# Patient Record
Sex: MALE | Race: WHITE | NOT HISPANIC OR LATINO | Employment: OTHER | URBAN - METROPOLITAN AREA
[De-identification: names, ages, dates, MRNs, and addresses within clinical notes are randomized per-mention and may not be internally consistent; named-entity substitution may affect disease eponyms.]

---

## 2022-05-24 ENCOUNTER — OFFICE VISIT (OUTPATIENT)
Dept: GASTROENTEROLOGY | Facility: CLINIC | Age: 82
End: 2022-05-24
Payer: COMMERCIAL

## 2022-05-24 ENCOUNTER — TELEPHONE (OUTPATIENT)
Dept: GASTROENTEROLOGY | Facility: CLINIC | Age: 82
End: 2022-05-24

## 2022-05-24 VITALS
SYSTOLIC BLOOD PRESSURE: 118 MMHG | WEIGHT: 258 LBS | BODY MASS INDEX: 36.12 KG/M2 | HEART RATE: 58 BPM | DIASTOLIC BLOOD PRESSURE: 82 MMHG | HEIGHT: 71 IN

## 2022-05-24 DIAGNOSIS — I26.99 OTHER ACUTE PULMONARY EMBOLISM WITHOUT ACUTE COR PULMONALE (HCC): ICD-10-CM

## 2022-05-24 DIAGNOSIS — Z79.01 CURRENT USE OF LONG TERM ANTICOAGULATION: ICD-10-CM

## 2022-05-24 DIAGNOSIS — C18.9 MALIGNANT NEOPLASM OF COLON, UNSPECIFIED PART OF COLON (HCC): Primary | ICD-10-CM

## 2022-05-24 PROBLEM — G47.33 OSA (OBSTRUCTIVE SLEEP APNEA): Status: ACTIVE | Noted: 2022-05-24

## 2022-05-24 PROBLEM — C61 PROSTATE CANCER (HCC): Status: ACTIVE | Noted: 2022-05-24

## 2022-05-24 PROCEDURE — 99204 OFFICE O/P NEW MOD 45 MIN: CPT | Performed by: NURSE PRACTITIONER

## 2022-05-24 RX ORDER — HYDROCHLOROTHIAZIDE 12.5 MG/1
12.5 TABLET ORAL DAILY
COMMUNITY

## 2022-05-24 RX ORDER — METOPROLOL SUCCINATE 50 MG/1
50 TABLET, EXTENDED RELEASE ORAL DAILY
COMMUNITY

## 2022-05-24 RX ORDER — ALFUZOSIN HYDROCHLORIDE 10 MG/1
10 TABLET, EXTENDED RELEASE ORAL DAILY
COMMUNITY

## 2022-05-24 RX ORDER — SODIUM PICOSULFATE, MAGNESIUM OXIDE, AND ANHYDROUS CITRIC ACID 10; 3.5; 12 MG/160ML; G/160ML; G/160ML
LIQUID ORAL
Qty: 320 ML | Refills: 0 | Status: SHIPPED | COMMUNITY
Start: 2022-05-24

## 2022-05-24 RX ORDER — SIMVASTATIN 10 MG
10 TABLET ORAL
COMMUNITY

## 2022-05-24 RX ORDER — TRAMADOL HYDROCHLORIDE 50 MG/1
50 TABLET ORAL AS NEEDED
COMMUNITY

## 2022-05-24 RX ORDER — FLUTICASONE PROPIONATE 50 MCG
1 SPRAY, SUSPENSION (ML) NASAL DAILY
COMMUNITY

## 2022-05-24 RX ORDER — AMLODIPINE BESYLATE AND BENAZEPRIL HYDROCHLORIDE 5; 20 MG/1; MG/1
1 CAPSULE ORAL DAILY
COMMUNITY

## 2022-05-24 NOTE — PROGRESS NOTES
[facility] - Outpatient Consultation  Juventino Weinstein 80 y o  male MRN: [id number]  Encounter: [id number]    ASSESSMENT AND PLAN:    1  History of colon cancer  2  Change in bowel habits/constipation  3  Screening for colon cancer  History of colon cancer which was resected approximately 10 years ago  No recurrence  Referred by Oncology for screening colonoscopy  One year history of constipation with hard bowel movement every 2-3 days  Denies melena or rectal bleeding  No alarm symptoms  Prior colonoscopy approximately 4 years ago was normal  -scheduled for colonoscopy at [facility] due to DASIA, Mallanpatti class 3 airway  - will obtain prior colonoscopy records from [facility]    4  History of pulmonary emboli  5  Anticoagulation  Patient reports pulmonary emboli approximately 5 years ago after undergoing hip surgery  Currently takes Eliquis b i d   Discussed risk of bleeding if colonoscopy performed on anticoagulation  Also discussed risk of thrombus formation with interrupting anticoagulation  Patient understands and accepts risks  -instructed to hold Eliquis 2 days prior to colonoscopy  -will confer with patient's PCP to ensure no further instructions or recommendations needed prior to holding anticoagulation    Followup Appointment:  As needed  ______________________________________________________________________    Chief Complaint   Patient presents with    Due for colonoscopy  Pt is on Eliquis       HPI:   Juventino Weinstein is a 80y o  year old male who presents to schedule surveillance colonoscopy  He has a history of colon cancer and underwent colon resection approximately 5 years ago  He follows with [facility] who referred him for colonoscopy    He has noticed over the past year he has developed constipation with hard formed bowel movement every 2-3 days over the past year    Previously had soft bowel movement daily Denies recent abdominal or rectal pain  No melena or rectal bleeding  Denies recent dysphagia  Appetite has been good, no nausea or vomiting, no unintentional weight loss  Last colonoscopy was approximately 4 years ago at [address] - study was normal, no polyps excised    Still follows with [facility]  - also has history ofprostate cancer, s/p xrt and leukemiaI  He currently takes Eliquis for history of pulmonary emboli which he thinks occurred after undergoing hip surgery 5 years ago    Historical Information   Past Medical History:   Diagnosis Date    [facility] )     [facility] )     Colon polyp     Hypertension     Leukemia ([facility] )     Prostate CA ([facility] )     Pulmonary emboli (HCC)      Past Surgical History:   Procedure Laterality Date    COLON SURGERY      COLONOSCOPY      HERNIA REPAIR      REPLACEMENT TOTAL HIP W/  RESURFACING IMPLANTS       Social History     Substance and Sexual Activity   Alcohol Use Yes    Alcohol/week: 1 0 standard drink    Types: 1 Glasses of wine per week    Comment: occasional     Social History     Substance and Sexual Activity   Drug Use Not on file     Social History     Tobacco Use   Smoking Status Former Smoker   Smokeless Tobacco Never Used     Family History   Problem Relation Age of Onset    Colon cancer Brother     Colon polyps Neg Hx        Meds/Allergies     Current Outpatient Medications:     alfuzosin (UROXATRAL) 10 mg 24 hr tablet    amLODIPine-benazepril (LOTREL 5-20) 5-20 MG per capsule    apixaban (Eliquis) 2 5 mg    fluticasone (FLONASE) 50 mcg/act nasal spray    hydrochlorothiazide (HYDRODIURIL) 12 5 mg tablet    metoprolol succinate (TOPROL-XL) 50 mg 24 hr tablet    simvastatin (ZOCOR) 10 mg tablet    traMADol (ULTRAM) 50 mg tablet    No Known Allergies    PHYSICAL EXAM:    Blood pressure 118/82, pulse 58, height 5' 11" (1 803 m), weight 117 kg (258 lb)  Body mass index is 35 98 kg/m²    General Appearance: NAD, cooperative, alert  Eyes: Anicteric  ENT:  Normocephalic, atraumatic, normal mucosa  Neck:  Supple, symmetrical, trachea midline,   Resp:  Clear to auscultation bilaterally; no rales, rhonchi or wheezing; respirations unlabored   CV:  S1 S2, Regular rate and rhythm; no murmur, rub, or gallop  GI:  Soft, non-tender, non-distended; normal bowel sounds; no masses, no organomegaly   Rectal: Deferred  Musculoskeletal: No cyanosis, clubbing or edema  Normal ROM  Skin:  No jaundice, rashes, or lesions   Psych: Normal affect, good eye contact  Neuro: No gross deficits, AAOx3          REVIEW OF SYSTEMS:    CONSTITUTIONAL: Denies any fever, chills, rigors, and weight loss  HEENT: No earache or tinnitus  Denies hearing loss or visual disturbances  CARDIOVASCULAR: No chest pain or palpitations  RESPIRATORY: Denies any cough, hemoptysis, shortness of breath or dyspnea on exertion  GASTROINTESTINAL: As noted in the History of Present Illness  GENITOURINARY: No problems with urination  Denies any hematuria or dysuria  NEUROLOGIC: No dizziness or vertigo, denies headaches  MUSCULOSKELETAL: Denies any muscle or joint pain  SKIN: Denies skin rashes or itching  ENDOCRINE: Denies excessive thirst  Denies intolerance to heat or cold  PSYCHOSOCIAL: Denies depression or anxiety  Denies any recent memory loss

## 2022-05-24 NOTE — LETTER
May 24, 2022     Robbie Howard MD  [address] [facility] [address]    Patient: Kristen Perea   YOB: 1940   Date of Visit: 5/24/2022       Dear Dr Harrell Gamma: Thank you for referring Kristen Perea to me for evaluation  Below are my notes for this consultation  If you have questions, please do not hesitate to call me  I look forward to following your patient along with you  Sincerely,        CHRISTIAN Bond        CC: MD Pancho Chang CRNP  5/24/2022  4:10 PM  Sign when Signing Visit    [address] [facility] - Outpatient Consultation  Kristen Perea 80 y o  male MRN: [id number]  Encounter: [id number]    ASSESSMENT AND PLAN:    1  History of colon cancer  2  Change in bowel habits/constipation  3  Screening for colon cancer  History of colon cancer which was resected approximately 10 years ago  No recurrence  Referred by Oncology for screening colonoscopy  One year history of constipation with hard bowel movement every 2-3 days  Denies melena or rectal bleeding  No alarm symptoms  Prior colonoscopy approximately 4 years ago was normal  -scheduled for colonoscopy at [facility] due to DASIA, Mallanpatti class 3 airway  - will obtain prior colonoscopy records from [facility]    4  History of pulmonary emboli  5  Anticoagulation  Patient reports pulmonary emboli approximately 5 years ago after undergoing hip surgery  Currently takes Eliquis b i d   Discussed risk of bleeding if colonoscopy performed on anticoagulation  Also discussed risk of thrombus formation with interrupting anticoagulation    Patient understands and accepts risks  -instructed to hold Eliquis 2 days prior to colonoscopy  -will confer with patient's PCP to ensure no further instructions or recommendations needed prior to holding anticoagulation    Followup Appointment:  As needed  ______________________________________________________________________    Chief Complaint   Patient presents with    Due for colonoscopy  Pt is on Eliquis       HPI:   Ermias Moreno is a 80y o  year old male who presents to schedule surveillance colonoscopy  He has a history of colon cancer and underwent colon resection approximately 5 years ago  He follows with [facility] who referred him for colonoscopy    He has noticed over the past year he has developed constipation with hard formed bowel movement every 2-3 days over the past year   Previously had soft bowel movement daily Denies recent abdominal or rectal pain  No melena or rectal bleeding  Denies recent dysphagia  Appetite has been good, no nausea or vomiting, no unintentional weight loss  Last colonoscopy was approximately 4 years ago at [address] - study was normal, no polyps excised    Still follows with [facility]  - also has history ofprostate cancer, s/p xrt and leukemiaI      He currently takes Eliquis for history of pulmonary emboli which he thinks occurred after undergoing hip surgery 5 years ago    Historical Information   Past Medical History:   Diagnosis Date    [facility])     [facility]     Colon polyp     Hypertension     [facility]ukemia [facility]     Prostate CA [facility]     Pulmonary emboli (HCC)      Past Surgical History:   Procedure Laterality Date    COLON SURGERY      COLONOSCOPY      HERNIA REPAIR      REPLACEMENT TOTAL HIP W/  RESURFACING IMPLANTS       Social History     Substance and Sexual Activity   Alcohol Use Yes    Alcohol/week: 1 0 standard drink    Types: 1 Glasses of wine per week    Comment: occasional     Social History     Substance and Sexual Activity   Drug Use Not on file     Social History     Tobacco Use   Smoking Status Former Smoker   Smokeless Tobacco Never Used     Family History   Problem Relation Age of Onset    Colon cancer Brother     Colon polyps Neg Hx        Meds/Allergies     Current Outpatient Medications:     alfuzosin (UROXATRAL) 10 mg 24 hr tablet    amLODIPine-benazepril (LOTREL 5-20) 5-20 MG per capsule    apixaban (Eliquis) 2 5 mg    fluticasone (FLONASE) 50 mcg/act nasal spray    hydrochlorothiazide (HYDRODIURIL) 12 5 mg tablet    metoprolol succinate (TOPROL-XL) 50 mg 24 hr tablet    simvastatin (ZOCOR) 10 mg tablet    traMADol (ULTRAM) 50 mg tablet    No Known Allergies    PHYSICAL EXAM:    Blood pressure 118/82, pulse 58, height 5' 11" (1 803 m), weight 117 kg (258 lb)  Body mass index is 35 98 kg/m²  General Appearance: NAD, cooperative, alert  Eyes: Anicteric  ENT:  Normocephalic, atraumatic, normal mucosa  Neck:  Supple, symmetrical, trachea midline,   Resp:  Clear to auscultation bilaterally; no rales, rhonchi or wheezing; respirations unlabored   CV:  S1 S2, Regular rate and rhythm; no murmur, rub, or gallop  GI:  Soft, non-tender, non-distended; normal bowel sounds; no masses, no organomegaly   Rectal: Deferred  Musculoskeletal: No cyanosis, clubbing or edema  Normal ROM  Skin:  No jaundice, rashes, or lesions   Psych: Normal affect, good eye contact  Neuro: No gross deficits, AAOx3          REVIEW OF SYSTEMS:    CONSTITUTIONAL: Denies any fever, chills, rigors, and weight loss  HEENT: No earache or tinnitus  Denies hearing loss or visual disturbances  CARDIOVASCULAR: No chest pain or palpitations  RESPIRATORY: Denies any cough, hemoptysis, shortness of breath or dyspnea on exertion  GASTROINTESTINAL: As noted in the History of Present Illness  GENITOURINARY: No problems with urination  Denies any hematuria or dysuria  NEUROLOGIC: No dizziness or vertigo, denies headaches  MUSCULOSKELETAL: Denies any muscle or joint pain  SKIN: Denies skin rashes or itching  ENDOCRINE: Denies excessive thirst  Denies intolerance to heat or cold  PSYCHOSOCIAL: Denies depression or anxiety  Denies any recent memory loss

## 2022-05-24 NOTE — TELEPHONE ENCOUNTER
Scheduled date of colonoscopy (as of today):6/28/22  Physician performing colonoscopy:Dr Huyen rAevalo  Location of colonoscopy:[facility]  Bowel prep reviewed with patient:Clenpiq  Instructions reviewed with patient by: Zunilda Nelson  Clearances: Yes-Vida

## 2023-05-19 NOTE — TELEPHONE ENCOUNTER
"Vitor MIRELESSHAKIRA Helm was seen and treated in our emergency department on 5/19/2023.  She may return to work on 05/22/2023.       If you have any questions or concerns, please don't hesitate to call.      Getachew Crespo NP" Clearance received and LVM for patient advising that his last dose prior to colonoscopy will be 6/25/2022  Asked that he call back to confirm